# Patient Record
Sex: MALE | Race: WHITE | NOT HISPANIC OR LATINO | Employment: FULL TIME | URBAN - METROPOLITAN AREA
[De-identification: names, ages, dates, MRNs, and addresses within clinical notes are randomized per-mention and may not be internally consistent; named-entity substitution may affect disease eponyms.]

---

## 2018-06-21 ENCOUNTER — OFFICE VISIT (OUTPATIENT)
Dept: FAMILY MEDICINE | Facility: CLINIC | Age: 63
End: 2018-06-21
Payer: MEDICARE

## 2018-06-21 VITALS
TEMPERATURE: 99.8 F | HEART RATE: 76 BPM | BODY MASS INDEX: 26.51 KG/M2 | SYSTOLIC BLOOD PRESSURE: 120 MMHG | HEIGHT: 69 IN | DIASTOLIC BLOOD PRESSURE: 68 MMHG | RESPIRATION RATE: 14 BRPM | WEIGHT: 179 LBS | OXYGEN SATURATION: 95 %

## 2018-06-21 DIAGNOSIS — L03.115 CELLULITIS OF RIGHT LEG: Primary | ICD-10-CM

## 2018-06-21 PROCEDURE — 99203 OFFICE O/P NEW LOW 30 MIN: CPT | Performed by: FAMILY MEDICINE

## 2018-06-21 RX ORDER — CEPHALEXIN 500 MG/1
500 CAPSULE ORAL 3 TIMES DAILY
Qty: 21 CAPSULE | Refills: 0 | Status: SHIPPED | OUTPATIENT
Start: 2018-06-21 | End: 2018-06-28

## 2018-06-21 NOTE — PATIENT INSTRUCTIONS
Cellulitis  Cellulitis is an infection of the deep layers of skin. A break in the skin, such as a cut or scratch, can let bacteria under the skin. If the bacteria get to deep layers of the skin, it can be serious. If not treated, cellulitis can get into the bloodstream and lymph nodes. The infection can then spread throughout the body. This causes serious illness.  Cellulitis causes the affected skin to become red, swollen, warm, and sore. The reddened areas have a visible border. An open sore may leak fluid (pus). You may have a fever, chills, and pain.  Cellulitis is treated with antibiotics taken for 7 to 10 days. An open sore may be cleaned and covered with cool wet gauze. Symptoms should get better 1 to 2 days after treatment is started. Make sure to take all the antibiotics for the full number of days until they are gone. Keep taking the medicine even if your symptoms go away.  Home care  Follow these tips:    Limit the use of the part of your body with cellulitis.     If the infection is on your leg, keep your leg raised while sitting. This will help to reduce swelling.    Take all of the antibiotic medicine exactly as directed until it is gone. Do not miss any doses, especially during the first 7 days. Don t stop taking the medicine when your symptoms get better.    Keep the affected area clean and dry.    Wash your hands with soap and warm water before and after touching your skin. Anyone else who touches your skin should also wash his or her hands. Don't share towels.  Follow-up care  Follow up with your healthcare provider, or as advised. If your infection does not go away on the first antibiotic, your healthcare provider will prescribe a different one.  When to seek medical advice  Call your healthcare provider right away if any of these occur:    Red areas that spread    Swelling or pain that gets worse    Fluid leaking from the skin (pus)    Fever higher of 100.4  F (38.0  C) or higher after 2 days  on antibiotics  Date Last Reviewed: 9/1/2016 2000-2017 The [employer]. [address] [place] [address], [place] [address]. All rights reserved. This information is not intended as a substitute for professional medical care. Always follow your healthcare professional's instructions.

## 2018-06-21 NOTE — PROGRESS NOTES
SUBJECTIVE:   Niko Harley is a 63 year old male who presents to clinic today for the following health issues:        Rash      Duration: Sudden onset of chills and flu-like symptoms on Monday (which have now resolved). Rash/redness in right lower leg started about 2 days ago     Description  Location: Right leg   Itching: no    Intensity:  severe    Accompanying signs and symptoms: Painful, red, swollen, tender to touch, had high temperature on Tuesday with body aches and chills    History (similar episodes/previous evaluation): None    Precipitating or alleviating factors:  New exposures:  None  Recent travel: no      Therapies tried and outcome: None        Walk-in pt who is new to our clinic, presenting today with 2 days of redness in right lower extremity.  Had previous flu-like symptoms which started last Monday with chills and malaise but now have resolved.  Had recent U/S study of his LE's as a routine follow up for his hx of LE edema and it did not show any abnormalities.    Problem list and histories reviewed & adjusted, as indicated.  Additional history: as documented    Labs reviewed in EPIC    Reviewed and updated as needed this visit by clinical staff  Tobacco  Allergies  Meds  Problems  Med Hx  Surg Hx  Fam Hx  Soc Hx        Reviewed and updated as needed this visit by Provider  Allergies  Meds  Problems         ROS:  CONSTITUTIONAL: NEGATIVE for fever, chills, change in weight  EYES: NEGATIVE for vision changes or irritation  ENT/MOUTH: NEGATIVE for ear, mouth and throat problems  RESP: NEGATIVE for significant cough or SOB  CV: NEGATIVE for chest pain, palpitations or peripheral edema  GI: NEGATIVE for nausea, abdominal pain, heartburn, or change in bowel habits  : NEGATIVE for frequency, dysuria, or hematuria  MUSCULOSKELETAL: NEGATIVE for significant arthralgias or myalgia  NEURO: NEGATIVE for weakness, dizziness or paresthesias  HEME: NEGATIVE for bleeding problems  PSYCHIATRIC:  "NEGATIVE for changes in mood or affect    OBJECTIVE:     /68 (BP Location: Left arm, Patient Position: Sitting, Cuff Size: Adult Regular)  Pulse 76  Temp 99.8  F (37.7  C) (Tympanic)  Resp 14  Ht 5' 9\" (1.753 m)  Wt 179 lb (81.2 kg)  SpO2 95%  BMI 26.43 kg/m2  Body mass index is 26.43 kg/(m^2).   GENERAL: alert and no acute distress  EYES: Eyes grossly normal to inspection, PERRL and conjunctivae and sclerae normal  HENT: ear canals and TM's normal, nose and mouth without ulcers or lesions  NECK: no adenopathy, no asymmetry, masses, or scars and thyroid normal to palpation  RESP: lungs clear to auscultation - no rales, rhonchi or wheezes  CV: regular rate and rhythm, normal S1 S2, no S3 or S4, no murmur, click or rub, and peripheral pulses strong  ABDOMEN: soft, nontender, no hepatosplenomegaly, no masses and bowel sounds normal  MS: no gross musculoskeletal defects noted  SKIN: erythematous well demarcated rash located from just below the right knee to the top of the right ankle joint.  No discharge or sores present.  Redness is non-blanchable.  +tenderness reproducible with moderate palpation.  +2 pitting edema in RLE as well.   NEURO: Normal strength and tone, mentation intact and speech normal  PSYCH: mentation appears normal, affect normal/bright    Diagnostic Test Results:  none     ASSESSMENT/PLAN:     Problem List Items Addressed This Visit     None      Visit Diagnoses     Cellulitis of right leg    -  Primary    Relevant Medications    cephALEXin (KEFLEX) 500 MG capsule         Rx Keflex.  Will RTC in 1-2 weeks or sooner if needed to f/u on right leg cellulitis.  Will keep leg dry and clean; raise leg frequently to help with associated LE edema.  May take Tylenol PRN pain if needed.  Will also make an appt soon to get preventive cares completed/discussed at an annual physical exam.        Ingrid Donovan, DO  [facility]  "

## 2018-06-21 NOTE — MR AVS SNAPSHOT
After Visit Summary   6/21/2018    Niko Harley    MRN: [id number]           Patient Information     Date Of Birth          1955        Visit Information        Provider Department      6/21/2018 1:10 PM Ingrid Donovan DO [facility]        Today's Diagnoses     Cellulitis of right leg    -  1      Care Instructions      Cellulitis  Cellulitis is an infection of the deep layers of skin. A break in the skin, such as a cut or scratch, can let bacteria under the skin. If the bacteria get to deep layers of the skin, it can be serious. If not treated, cellulitis can get into the bloodstream and lymph nodes. The infection can then spread throughout the body. This causes serious illness.  Cellulitis causes the affected skin to become red, swollen, warm, and sore. The reddened areas have a visible border. An open sore may leak fluid (pus). You may have a fever, chills, and pain.  Cellulitis is treated with antibiotics taken for 7 to 10 days. An open sore may be cleaned and covered with cool wet gauze. Symptoms should get better 1 to 2 days after treatment is started. Make sure to take all the antibiotics for the full number of days until they are gone. Keep taking the medicine even if your symptoms go away.  Home care  Follow these tips:    Limit the use of the part of your body with cellulitis.     If the infection is on your leg, keep your leg raised while sitting. This will help to reduce swelling.    Take all of the antibiotic medicine exactly as directed until it is gone. Do not miss any doses, especially during the first 7 days. Don t stop taking the medicine when your symptoms get better.    Keep the affected area clean and dry.    Wash your hands with soap and warm water before and after touching your skin. Anyone else who touches your skin should also wash his or her hands. Don't share towels.  Follow-up care  Follow up with your healthcare provider, or as  "advised. If your infection does not go away on the first antibiotic, your healthcare provider will prescribe a different one.  When to seek medical advice  Call your healthcare provider right away if any of these occur:    Red areas that spread    Swelling or pain that gets worse    Fluid leaking from the skin (pus)    Fever higher of 100.4  F (38.0  C) or higher after 2 days on antibiotics  Date Last Reviewed: 9/1/2016 2000-2017 [employer]. [address] [place] [address]. All rights reserved. This information is not intended as a substitute for professional medical care. Always follow your healthcare professional's instructions.                Follow-ups after your visit        Who to contact     If you have questions or need follow up information about today's clinic visit or your schedule please contact [facility] directly at [phone].  Normal or non-critical lab and imaging results will be communicated to you by MyChart, letter or phone within 4 business days after the clinic has received the results. If you do not hear from us within 7 days, please contact the clinic through MyChart or phone. If you have a critical or abnormal lab result, we will notify you by phone as soon as possible.  Submit refill requests through [employer] or call your pharmacy and they will forward the refill request to us. Please allow 3 business days for your refill to be completed.          Additional Information About Your Visit        Care EveryWhere ID     This is your Care EveryWhere ID. This could be used by other organizations to access your [place] medical records  [id number]        Your Vitals Were     Pulse Temperature Respirations Height Pulse Oximetry BMI (Body Mass Index)    76 99.8  F (37.7  C) (Tympanic) 14 5' 9\" (1.753 m) 95% 26.43 kg/m2       Blood Pressure from Last 3 Encounters:   06/21/18 120/68    Weight from Last 3 Encounters:   06/21/18 179 lb (81.2 " kg)              Today, you had the following     No orders found for display         Today's Medication Changes          These changes are accurate as of 6/21/18  1:42 PM.  If you have any questions, ask your nurse or doctor.               Start taking these medicines.        Dose/Directions    cephALEXin 500 MG capsule   Commonly known as:  KEFLEX   Used for:  Cellulitis of right leg   Started by:  Ingrid Donovan DO        Dose:  500 mg   Take 1 capsule (500 mg) by mouth 3 times daily for 7 days   Quantity:  21 capsule   Refills:  0            Where to get your medicines      These medications were sent to [facility]/pharmacy #[id number] - [place], [place] - [address]  [address] [facility] [address]     Phone:  [phone]     cephALEXin 500 MG capsule                Primary Care Provider Office Phone # Fax #    Ingrid Donovan [phone]       [address]  [facility] [address]        Equal Access to Services     MARGE FLORENCE : Hadii glenna ku hadasho Soomaali, waaxda luqadaha, qaybta kaalmada adeegyada, waxay petronain haymichellen simin bhatt . So [facility] [phone].    ATENCIÓN: Si habla español, tiene a guillermo disposición servicios gratuitos de asistencia lingüística. Llame al [phone].    We comply with applicable federal civil rights laws and [place] laws. We do not discriminate on the basis of race, color, national origin, age, disability, sex, sexual orientation, or gender identity.            Thank you!     Thank you for choosing [facility]  for your care. Our goal is always to provide you with excellent care. Hearing back from our patients is one way we can continue to improve our services. Please take a few minutes to complete the written survey that you may receive in the mail after your visit with us. Thank you!             Your Updated Medication List - Protect others around you: Learn how to safely use, store and throw away your  medicines at www.disposemymeds.org.          This list is accurate as of 6/21/18  1:42 PM.  Always use your most recent med list.                   Brand Name Dispense Instructions for use Diagnosis    cephALEXin 500 MG capsule    KEFLEX    21 capsule    Take 1 capsule (500 mg) by mouth 3 times daily for 7 days    Cellulitis of right leg

## 2018-06-29 ENCOUNTER — OFFICE VISIT (OUTPATIENT)
Dept: FAMILY MEDICINE | Facility: CLINIC | Age: 63
End: 2018-06-29
Payer: MEDICARE

## 2018-06-29 VITALS
WEIGHT: 172 LBS | BODY MASS INDEX: 25.4 KG/M2 | HEART RATE: 82 BPM | OXYGEN SATURATION: 94 % | SYSTOLIC BLOOD PRESSURE: 116 MMHG | DIASTOLIC BLOOD PRESSURE: 78 MMHG | RESPIRATION RATE: 16 BRPM | TEMPERATURE: 97.5 F

## 2018-06-29 DIAGNOSIS — E78.5 HYPERLIPIDEMIA, UNSPECIFIED HYPERLIPIDEMIA TYPE: ICD-10-CM

## 2018-06-29 DIAGNOSIS — L03.115 CELLULITIS OF RIGHT LOWER EXTREMITY: Primary | ICD-10-CM

## 2018-06-29 DIAGNOSIS — R73.03 PRE-DIABETES: ICD-10-CM

## 2018-06-29 DIAGNOSIS — Z72.0 TOBACCO ABUSE: ICD-10-CM

## 2018-06-29 DIAGNOSIS — Z71.6 TOBACCO ABUSE COUNSELING: ICD-10-CM

## 2018-06-29 LAB
ANION GAP SERPL CALCULATED.3IONS-SCNC: 8 MMOL/L (ref 3–14)
BASOPHILS # BLD AUTO: 0 10E9/L (ref 0–0.2)
BASOPHILS NFR BLD AUTO: 0.4 %
BUN SERPL-MCNC: 16 MG/DL (ref 7–30)
CALCIUM SERPL-MCNC: 9.2 MG/DL (ref 8.5–10.1)
CHLORIDE SERPL-SCNC: 107 MMOL/L (ref 94–109)
CO2 SERPL-SCNC: 24 MMOL/L (ref 20–32)
CREAT SERPL-MCNC: 0.86 MG/DL (ref 0.66–1.25)
DIFFERENTIAL METHOD BLD: NORMAL
EOSINOPHIL # BLD AUTO: 0.3 10E9/L (ref 0–0.7)
EOSINOPHIL NFR BLD AUTO: 2.6 %
ERYTHROCYTE [DISTWIDTH] IN BLOOD BY AUTOMATED COUNT: 14.5 % (ref 10–15)
GFR SERPL CREATININE-BSD FRML MDRD: 89 ML/MIN/1.7M2
GLUCOSE SERPL-MCNC: 92 MG/DL (ref 70–99)
HBA1C MFR BLD: 5.8 % (ref 0–5.6)
HCT VFR BLD AUTO: 45.4 % (ref 40–53)
HGB BLD-MCNC: 15.4 G/DL (ref 13.3–17.7)
LYMPHOCYTES # BLD AUTO: 2.8 10E9/L (ref 0.8–5.3)
LYMPHOCYTES NFR BLD AUTO: 29.1 %
MCH RBC QN AUTO: 31.6 PG (ref 26.5–33)
MCHC RBC AUTO-ENTMCNC: 33.9 G/DL (ref 31.5–36.5)
MCV RBC AUTO: 93 FL (ref 78–100)
MONOCYTES # BLD AUTO: 0.8 10E9/L (ref 0–1.3)
MONOCYTES NFR BLD AUTO: 7.7 %
NEUTROPHILS # BLD AUTO: 5.8 10E9/L (ref 1.6–8.3)
NEUTROPHILS NFR BLD AUTO: 60.2 %
PLATELET # BLD AUTO: 284 10E9/L (ref 150–450)
POTASSIUM SERPL-SCNC: 4.2 MMOL/L (ref 3.4–5.3)
RBC # BLD AUTO: 4.88 10E12/L (ref 4.4–5.9)
SODIUM SERPL-SCNC: 139 MMOL/L (ref 133–144)
WBC # BLD AUTO: 9.7 10E9/L (ref 4–11)

## 2018-06-29 PROCEDURE — 83036 HEMOGLOBIN GLYCOSYLATED A1C: CPT | Performed by: FAMILY MEDICINE

## 2018-06-29 PROCEDURE — 85025 COMPLETE CBC W/AUTO DIFF WBC: CPT | Performed by: FAMILY MEDICINE

## 2018-06-29 PROCEDURE — 80048 BASIC METABOLIC PNL TOTAL CA: CPT | Performed by: FAMILY MEDICINE

## 2018-06-29 PROCEDURE — 36415 COLL VENOUS BLD VENIPUNCTURE: CPT | Performed by: FAMILY MEDICINE

## 2018-06-29 PROCEDURE — 99214 OFFICE O/P EST MOD 30 MIN: CPT | Performed by: FAMILY MEDICINE

## 2018-06-29 RX ORDER — SULFAMETHOXAZOLE/TRIMETHOPRIM 800-160 MG
1 TABLET ORAL 2 TIMES DAILY
Qty: 14 TABLET | Refills: 0 | Status: SHIPPED | OUTPATIENT
Start: 2018-06-29

## 2018-06-29 ASSESSMENT — PATIENT HEALTH QUESTIONNAIRE - PHQ9
10. IF YOU CHECKED OFF ANY PROBLEMS, HOW DIFFICULT HAVE THESE PROBLEMS MADE IT FOR YOU TO DO YOUR WORK, TAKE CARE OF THINGS AT HOME, OR GET ALONG WITH OTHER PEOPLE: NOT DIFFICULT AT ALL
SUM OF ALL RESPONSES TO PHQ QUESTIONS 1-9: 0
SUM OF ALL RESPONSES TO PHQ QUESTIONS 1-9: 0

## 2018-06-29 NOTE — LETTER
"July 5, 2018      Niko Harley  [address] [facility] [address]        Dear ,    We are writing to inform you of your test results.    1.  \"PRE-DIABETES\" :  It looks like you have \"pre-Diabetes\" with a HgbA1c of 5.8%.    Pre-Diabetes is with a  HgbA2c of 5.7--6.4%  Diabetes is with a HgbA1c of 6.5% and above.    We should plan on re-checking your HgbA1c in about 3-6 months to make sure that it gets better and does not worsen.  Things to help lower your HgbA1c and prevent the development of Diabetes includes working on lifestyle changes (diet, exercise, weight-loss, quitting smoking, etc).    When you have \"pre-Diabetes\" you are at a greater risk of developing Diabetes if you do not work on those lifestyle changes.    2.  OTHER LABS :  Your blood count and kidney function and electrolytes are all normal, which is great news!       Resulted Orders   Hemoglobin A1c   Result Value Ref Range    Hemoglobin A1C 5.8 (H) 0 - 5.6 %      Comment:      Normal <5.7% Prediabetes 5.7-6.4%  Diabetes 6.5% or higher - adopted from ADA   consensus guidelines.     Basic metabolic panel  (Ca, Cl, CO2, Creat, Gluc, K, Na, BUN)   Result Value Ref Range    Sodium 139 133 - 144 mmol/L    Potassium 4.2 3.4 - 5.3 mmol/L    Chloride 107 94 - 109 mmol/L    Carbon Dioxide 24 20 - 32 mmol/L    Anion Gap 8 3 - 14 mmol/L    Glucose 92 70 - 99 mg/dL    Urea Nitrogen 16 7 - 30 mg/dL    Creatinine 0.86 0.66 - 1.25 mg/dL    GFR Estimate 89 >60 mL/min/1.7m2      Comment:      Non  GFR Calc    GFR Estimate If Black >90 >60 mL/min/1.7m2      Comment:       GFR Calc    Calcium 9.2 8.5 - 10.1 mg/dL   CBC with platelets and differential   Result Value Ref Range    WBC 9.7 4.0 - 11.0 10e9/L    RBC Count 4.88 4.4 - 5.9 10e12/L    Hemoglobin 15.4 13.3 - 17.7 g/dL    Hematocrit 45.4 40.0 - 53.0 %    MCV 93 78 - 100 fl    MCH 31.6 26.5 - 33.0 pg    MCHC 33.9 31.5 - 36.5 g/dL    RDW 14.5 10.0 - 15.0 %    Platelet " Count 284 150 - 450 10e9/L    Diff Method Automated Method     % Neutrophils 60.2 %    % Lymphocytes 29.1 %    % Monocytes 7.7 %    % Eosinophils 2.6 %    % Basophils 0.4 %    Absolute Neutrophil 5.8 1.6 - 8.3 10e9/L    Absolute Lymphocytes 2.8 0.8 - 5.3 10e9/L    Absolute Monocytes 0.8 0.0 - 1.3 10e9/L    Absolute Eosinophils 0.3 0.0 - 0.7 10e9/L    Absolute Basophils 0.0 0.0 - 0.2 10e9/L       If you have any questions or concerns, please call the clinic at the number listed above.       Sincerely,        Ingrid Donovan, DO

## 2018-06-29 NOTE — MR AVS SNAPSHOT
After Visit Summary   6/29/2018    Niko Harley    MRN: [id number]           Patient Information     Date Of Birth          1955        Visit Information        Provider Department      6/29/2018 9:50 AM Ingrid Donovan DO [facility]        Today's Diagnoses     Cellulitis of right lower extremity    -  1    Pre-diabetes           Follow-ups after your visit        Follow-up notes from your care team     Return in about 1 week (around 7/6/2018).      Who to contact     If you have questions or need follow up information about today's clinic visit or your schedule please contact [facility] directly at [phone].  Normal or non-critical lab and imaging results will be communicated to you by MyChart, letter or phone within 4 business days after the clinic has received the results. If you do not hear from us within 7 days, please contact the clinic through MyChart or phone. If you have a critical or abnormal lab result, we will notify you by phone as soon as possible.  Submit refill requests through [employer] or call your pharmacy and they will forward the refill request to us. Please allow 3 business days for your refill to be completed.          Additional Information About Your Visit        Care EveryWhere ID     This is your Care EveryWhere ID. This could be used by other organizations to access your [place] medical records  [id number]        Your Vitals Were     Pulse Temperature Respirations Pulse Oximetry BMI (Body Mass Index)       82 97.5  F (36.4  C) (Tympanic) 16 94% 25.4 kg/m2        Blood Pressure from Last 3 Encounters:   06/29/18 116/78   06/21/18 120/68    Weight from Last 3 Encounters:   06/29/18 172 lb (78 kg)   06/21/18 179 lb (81.2 kg)              We Performed the Following     Basic metabolic panel  (Ca, Cl, CO2, Creat, Gluc, K, Na, BUN)     CBC with platelets and differential     Hemoglobin A1c           Today's Medication Changes          These changes are accurate as of 6/29/18 10:20 AM.  If you have any questions, ask your nurse or doctor.               Start taking these medicines.        Dose/Directions    sulfamethoxazole-trimethoprim 800-160 MG per tablet   Commonly known as:  BACTRIM DS/SEPTRA DS   Used for:  Cellulitis of right lower extremity   Started by:  Ingrid Donovan DO        Dose:  1 tablet   Take 1 tablet by mouth 2 times daily   Quantity:  14 tablet   Refills:  0            Where to get your medicines      These medications were sent to [facility]/pharmacy #[id number] - [place], [place] - [address]  [address] [facility] [address]     Phone:  [phone]     sulfamethoxazole-trimethoprim 800-160 MG per tablet                Primary Care Provider Office Phone # Fax #    Ingrid Donovan [phone] [address]  [facility] [address]        Equal Access to Services     MARGE FLORENCE : Hadii glenna ku hadasho Soomaali, waaxda luqadaha, qaybta kaalmada adeegyada, waxay petronain hayraghavendra bhatt . So [facility] [phone].    ATENCIÓN: Si habla español, tiene a guillermo disposición servicios gratuitos de asistencia lingüística. Llame al [phone].    We comply with applicable federal civil rights laws and [place] laws. We do not discriminate on the basis of race, color, national origin, age, disability, sex, sexual orientation, or gender identity.            Thank you!     [facility]  [facility] your [facility]. Our goal is always to provide you with excellent care. Hearing back from our patients is one way we can continue to improve our services. Please take a few minutes to complete the written survey that you may receive in the mail after your visit with us. Thank you!             Your Updated Medication List - Protect others around you: Learn how to safely use, store and throw away your medicines at  www.disposemymeds.org.          This list is accurate as of 6/29/18 10:20 AM.  Always use your most recent med list.                   Brand Name Dispense Instructions for use Diagnosis    sulfamethoxazole-trimethoprim 800-160 MG per tablet    BACTRIM DS/SEPTRA DS    14 tablet    Take 1 tablet by mouth 2 times daily    Cellulitis of right lower extremity

## 2018-06-29 NOTE — PROGRESS NOTES
SUBJECTIVE:   Niko Harley is a 63 year old male who presents to clinic today for the following health issues:      Cellulitis follow Up      Duration: started 6/18/18    Description  Location: RT lower leg    Intensity:  moderate    Accompanying signs and symptoms: swelling, redness, lethagic and tender    History  Previous similar problem: no   Previous evaluation:  none    Precipitating or alleviating factors:  Trauma or overuse: no   Aggravating factors include: none    Therapies tried and outcome: Keflex- finished course last night    Area of redness on RLE has improved but still present and somewhat painful to touch.  Continues to smoke tobacco.  Had HgbA1c tested >1 year ago at outside clinic which showed pre-DM (5.8%).    Problem list and histories reviewed & adjusted, as indicated.  Additional history: as documented    Labs reviewed in EPIC    Reviewed and updated as needed this visit by clinical staff  Tobacco  Allergies  Meds  Problems  Med Hx  Surg Hx  Fam Hx  Soc Hx        Reviewed and updated as needed this visit by Provider  Allergies  Meds  Problems         PHQ-2 Score: 3  If you checked off any problems, how difficult have these problems made it for you to do your work, take care of things at home, or get along with other people?: Not difficult at all  PHQ9 TOTAL SCORE: 0      ROS:  CONSTITUTIONAL: NEGATIVE for fever, chills, change in weight  EYES: NEGATIVE for vision changes or irritation  ENT/MOUTH: NEGATIVE for ear, mouth and throat problems  RESP: NEGATIVE for significant cough or SOB  CV: NEGATIVE for chest pain, palpitations or peripheral edema  GI: NEGATIVE for nausea, abdominal pain, heartburn, or change in bowel habits  : NEGATIVE for frequency, dysuria, or hematuria  MUSCULOSKELETAL: NEGATIVE for significant arthralgias or myalgia  NEURO: NEGATIVE for weakness, dizziness or paresthesias  HEME: NEGATIVE for bleeding problems    OBJECTIVE:     /78 (Cuff Size: Adult  Regular)  Pulse 82  Temp 97.5  F (36.4  C) (Tympanic)  Resp 16  Wt 172 lb (78 kg)  SpO2 94%  BMI 25.4 kg/m2  Body mass index is 25.4 kg/(m^2).   GENERAL: alert and no acute distress  EYES: Eyes grossly normal to inspection, PERRL and conjunctivae and sclerae normal  HENT: Nose and mouth without ulcers or lesions  NECK: no adenopathy, no asymmetry, masses, or scars and thyroid normal to palpation  RESP: lungs clear to auscultation - no rales, rhonchi or wheezes  CV: regular rate and rhythm, normal S1 S2, no S3 or S4, no murmur, click or rub, and peripheral pulses strong  ABDOMEN: soft, nontender, no hepatosplenomegaly, no masses and bowel sounds normal  MS: no gross musculoskeletal defects noted  SKIN: erythematous well demarcated rash located from just below the right knee to the top of the right ankle joint.  No discharge or sores present.  Redness is blanchable.  +tenderness reproducible with moderate palpation.  +1 non-pitting edema in RLE as well.   NEURO: Normal strength and tone, mentation intact and speech normal  PSYCH: +irritableAnswers for HPI/ROS submitted by the patient on 6/29/2018        Diagnostic Test Results:  none     ASSESSMENT/PLAN:     Problem List Items Addressed This Visit     Pre-diabetes    Relevant Orders    Hemoglobin A1c (Completed)    Basic metabolic panel  (Ca, Cl, CO2, Creat, Gluc, K, Na, BUN) (Completed)    Tobacco abuse    HLD (hyperlipidemia)      Other Visit Diagnoses     Cellulitis of right lower extremity    -  Primary    Relevant Medications    sulfamethoxazole-trimethoprim (BACTRIM DS/SEPTRA DS) 800-160 MG per tablet    Other Relevant Orders    CBC with platelets and differential (Completed)    Tobacco abuse counseling             --Rx Bactrim for RLE cellulitis.  Finished Keflex yesterday with some persisting symptoms/signs of RLE Cellulitis, so will add Bactrim today.  Denies hx MRSA infection  --In Care Everywhere, appears to have Pre-DM with HgbA1c 5.8% (4/2017) so  "will re-check today, along with CBC and BMP.  Lipids check in April 2017 as well.  Refused Lipitor prescribed by previous provider for HLD because he didn't want to \"take that crap.\"    --Tobacco cessation counseling provided; has no desire to quit smoking.    Ingrid Donovan, DO  [facility]    "

## 2018-06-30 ASSESSMENT — PATIENT HEALTH QUESTIONNAIRE - PHQ9: SUM OF ALL RESPONSES TO PHQ QUESTIONS 1-9: 0

## 2019-06-24 ENCOUNTER — TELEPHONE (OUTPATIENT)
Dept: FAMILY MEDICINE | Facility: CLINIC | Age: 64
End: 2019-06-24

## 2019-06-24 NOTE — TELEPHONE ENCOUNTER
Panel Management Review      Patient has the following on his problem list: None      Composite cancer screening  Chart review shows that this patient is due/due soon for the following Colonoscopy  Summary:    Patient is due/failing the following:   COLONOSCOPY and PHYSICAL    Action needed:   Patient needs office visit for Physical  .    Type of outreach:    Sent letter.    Questions for provider review:    None                                                                                                                                    Sindy Leonardo LPN     Chart routed to  .

## 2019-06-24 NOTE — LETTER
June 24, 2019    Niko Harley  [address]  [facility] [address]    Dear Ricardo Pope cares about your health and your health plan.  I have reviewed your medical conditions, medication list and lab results, and am making recommendations based on this review to better manage your health.    You are in particular need of attention regarding:  -Immunizations  -Colon Cancer Screening  -Wellness (Physical) Visit     I am recommending that you:     -schedule a WELLNESS (Physical) APPOINTMENT with me.   I will check fasting labs the same day - nothing to eat except water and meds for 8-10 hours prior. (If you go elsewhere for Wellness visits then please disregard this reminder.)    -schedule a COLONOSCOPY to look for colon cancer (due every 10 years or 5 years in higher risk situations.)        Colon cancer is now the second leading cause of cancer-related deaths in the [place] for both men and women and there are over 130,000 new cases and 50,000 deaths per year from colon cancer.  Colonoscopies can prevent 90-95% of these deaths.  Problem lesions can be removed before they ever become cancer.  This test is not only looking for cancer, but also getting rid of precancerious lesions.    If you are under/uninsured, we recommend you contact the [employer] program. [employer] is a free colorectal cancer screening program that provides colonoscopies for eligible under/uninsured [place] men and women. If you are interested in receiving a free colonoscopy, please call [employer] at [phone] (mention code [employer]) to see if you re eligible.      If you do not wish to do a colonoscopy or cannot afford to do one, at this time, there is another option. It is called a FIT test or Fecal Immunochemical Occult Blood Test (take home stool sample kit).  It does not replace the colonoscopy for colorectal cancer screening, but it can detect hidden bleeding in the lower colon.  It does need to be repeated  every year and if a positive result is obtained, you would be referred for a colonoscopy.          If you have completed either one of these tests at another facility, please call with the details of when and where the tests were done and if they were normal or not. Or have the records sent to our clinic so that we can best coordinate your care.      Please call us at the [facility] location:  [phone] or use [employer] to address the above recommendations.     Thank you for trusting [facility].  We appreciate the opportunity to serve you and look forward to supporting your healthcare in the future.    If you have (or plan to have) any of these tests done at a facility other than a [facility] or a [facility], please have the results sent to the [facility] location noted above.      Best Regards,    Ingrid Donovan, DO

## 2024-06-07 ENCOUNTER — HOSPITAL ENCOUNTER (EMERGENCY)
Facility: CLINIC | Age: 69
Discharge: HOME | End: 2024-06-07
Attending: EMERGENCY MEDICINE | Admitting: EMERGENCY MEDICINE
Payer: COMMERCIAL

## 2024-06-07 ENCOUNTER — APPOINTMENT (OUTPATIENT)
Dept: GENERAL RADIOLOGY | Facility: CLINIC | Age: 69
End: 2024-06-07
Attending: EMERGENCY MEDICINE
Payer: COMMERCIAL

## 2024-06-07 VITALS
DIASTOLIC BLOOD PRESSURE: 65 MMHG | RESPIRATION RATE: 14 BRPM | HEIGHT: 69 IN | WEIGHT: 170 LBS | TEMPERATURE: 98 F | HEART RATE: 70 BPM | BODY MASS INDEX: 25.18 KG/M2 | OXYGEN SATURATION: 96 % | SYSTOLIC BLOOD PRESSURE: 128 MMHG

## 2024-06-07 DIAGNOSIS — M62.830 SPASM OF THORACIC BACK MUSCLE: ICD-10-CM

## 2024-06-07 DIAGNOSIS — I45.10 RIGHT BUNDLE BRANCH BLOCK: ICD-10-CM

## 2024-06-07 LAB
ALBUMIN SERPL BCG-MCNC: 4.3 G/DL (ref 3.5–5.2)
ALP SERPL-CCNC: 85 U/L (ref 40–150)
ALT SERPL W P-5'-P-CCNC: 14 U/L (ref 0–70)
ANION GAP SERPL CALCULATED.3IONS-SCNC: 11 MMOL/L (ref 7–15)
AST SERPL W P-5'-P-CCNC: 19 U/L (ref 0–45)
ATRIAL RATE - MUSE: 72 BPM
BASOPHILS # BLD AUTO: 0.1 10E3/UL (ref 0–0.2)
BASOPHILS NFR BLD AUTO: 1 %
BILIRUB SERPL-MCNC: 0.2 MG/DL
BUN SERPL-MCNC: 20 MG/DL (ref 8–23)
CALCIUM SERPL-MCNC: 9.6 MG/DL (ref 8.8–10.2)
CHLORIDE SERPL-SCNC: 104 MMOL/L (ref 98–107)
CREAT SERPL-MCNC: 1.02 MG/DL (ref 0.67–1.17)
DEPRECATED HCO3 PLAS-SCNC: 24 MMOL/L (ref 22–29)
DIASTOLIC BLOOD PRESSURE - MUSE: NORMAL MMHG
EGFRCR SERPLBLD CKD-EPI 2021: 79 ML/MIN/1.73M2
EOSINOPHIL # BLD AUTO: 0.2 10E3/UL (ref 0–0.7)
EOSINOPHIL NFR BLD AUTO: 2 %
ERYTHROCYTE [DISTWIDTH] IN BLOOD BY AUTOMATED COUNT: 14 % (ref 10–15)
GLUCOSE SERPL-MCNC: 120 MG/DL (ref 70–99)
HCT VFR BLD AUTO: 43.8 % (ref 40–53)
HGB BLD-MCNC: 14.7 G/DL (ref 13.3–17.7)
HOLD SPECIMEN: NORMAL
IMM GRANULOCYTES # BLD: 0 10E3/UL
IMM GRANULOCYTES NFR BLD: 0 %
INTERPRETATION ECG - MUSE: NORMAL
LYMPHOCYTES # BLD AUTO: 2.2 10E3/UL (ref 0.8–5.3)
LYMPHOCYTES NFR BLD AUTO: 25 %
MCH RBC QN AUTO: 30.8 PG (ref 26.5–33)
MCHC RBC AUTO-ENTMCNC: 33.6 G/DL (ref 31.5–36.5)
MCV RBC AUTO: 92 FL (ref 78–100)
MONOCYTES # BLD AUTO: 0.8 10E3/UL (ref 0–1.3)
MONOCYTES NFR BLD AUTO: 8 %
NEUTROPHILS # BLD AUTO: 5.9 10E3/UL (ref 1.6–8.3)
NEUTROPHILS NFR BLD AUTO: 64 %
NRBC # BLD AUTO: 0 10E3/UL
NRBC BLD AUTO-RTO: 0 /100
P AXIS - MUSE: 73 DEGREES
PLATELET # BLD AUTO: 211 10E3/UL (ref 150–450)
POTASSIUM SERPL-SCNC: 4.1 MMOL/L (ref 3.4–5.3)
PR INTERVAL - MUSE: 182 MS
PROT SERPL-MCNC: 6.9 G/DL (ref 6.4–8.3)
QRS DURATION - MUSE: 134 MS
QT - MUSE: 412 MS
QTC - MUSE: 451 MS
R AXIS - MUSE: 93 DEGREES
RBC # BLD AUTO: 4.77 10E6/UL (ref 4.4–5.9)
SODIUM SERPL-SCNC: 139 MMOL/L (ref 135–145)
SYSTOLIC BLOOD PRESSURE - MUSE: NORMAL MMHG
T AXIS - MUSE: 66 DEGREES
TROPONIN T SERPL HS-MCNC: 8 NG/L
VENTRICULAR RATE- MUSE: 72 BPM
WBC # BLD AUTO: 9.1 10E3/UL (ref 4–11)

## 2024-06-07 PROCEDURE — 82040 ASSAY OF SERUM ALBUMIN: CPT | Performed by: EMERGENCY MEDICINE

## 2024-06-07 PROCEDURE — 93005 ELECTROCARDIOGRAM TRACING: CPT

## 2024-06-07 PROCEDURE — 99285 EMERGENCY DEPT VISIT HI MDM: CPT | Mod: 25

## 2024-06-07 PROCEDURE — 85004 AUTOMATED DIFF WBC COUNT: CPT | Performed by: EMERGENCY MEDICINE

## 2024-06-07 PROCEDURE — 80053 COMPREHEN METABOLIC PANEL: CPT | Performed by: EMERGENCY MEDICINE

## 2024-06-07 PROCEDURE — 84484 ASSAY OF TROPONIN QUANT: CPT | Performed by: EMERGENCY MEDICINE

## 2024-06-07 PROCEDURE — 36415 COLL VENOUS BLD VENIPUNCTURE: CPT | Performed by: EMERGENCY MEDICINE

## 2024-06-07 PROCEDURE — 71046 X-RAY EXAM CHEST 2 VIEWS: CPT

## 2024-06-07 PROCEDURE — 85025 COMPLETE CBC W/AUTO DIFF WBC: CPT | Performed by: EMERGENCY MEDICINE

## 2024-06-07 RX ORDER — METHOCARBAMOL 500 MG/1
1000 TABLET, FILM COATED ORAL 3 TIMES DAILY PRN
Qty: 30 TABLET | Refills: 0 | Status: SHIPPED | OUTPATIENT
Start: 2024-06-07

## 2024-06-07 RX ORDER — LIDOCAINE 4 G/G
1 PATCH TOPICAL DAILY PRN
Qty: 15 PATCH | Refills: 0 | Status: SHIPPED | OUTPATIENT
Start: 2024-06-07

## 2024-06-07 ASSESSMENT — ACTIVITIES OF DAILY LIVING (ADL)
ADLS_ACUITY_SCORE: 35

## 2024-06-07 ASSESSMENT — COLUMBIA-SUICIDE SEVERITY RATING SCALE - C-SSRS
6. HAVE YOU EVER DONE ANYTHING, STARTED TO DO ANYTHING, OR PREPARED TO DO ANYTHING TO END YOUR LIFE?: NO
2. HAVE YOU ACTUALLY HAD ANY THOUGHTS OF KILLING YOURSELF IN THE PAST MONTH?: NO
1. IN THE PAST MONTH, HAVE YOU WISHED YOU WERE DEAD OR WISHED YOU COULD GO TO SLEEP AND NOT WAKE UP?: NO

## 2024-06-07 NOTE — ED TRIAGE NOTES
Patient states has a nagging pain to left shoulder blade.  Taking ibuprofen.  Today, feeling a little faint, lightheaded; almost dizziness.  Denies chest pain.  Slightly nauseous.    Shoulder pain may have been caused by mowing lawn 8-9 days ago.  Seen at  today.  Sent for further eval of new RBBB.  No previous EKG.

## 2024-06-08 NOTE — ED NOTES
Patient up to the desk to see where he was in line.  Pt was informed of his placement and process.

## 2024-06-08 NOTE — ED PROVIDER NOTES
Emergency Department Note      History of Present Illness     Chief Complaint  Shoulder Pain    HPI  Niko Harley is a 69 year old male with history of hyperlipidemia who presents to the ED for evaluation of left upper back pain which has been present for 3 to 4 days.  Pain is worse with movement of the left arm especially when he holds the left arm up out in front of him.  No fall or trauma.  No anterior chest pain.  No neck pain.  He has taken 600 mg of ibuprofen without relief.  Patient was seen at urgent care prior to arrival here and referred to the emergency department after he was found to have right bundle branch block on EKG.  Patient states that this is a known finding for him and he has previously seen a cardiologist who has told him not to worry about it.  He denies any numbness tingling or weakness in the left arm.  No fever.  No rash.  He denies any other symptoms at this time.        Independent Historian  None    Review of External Notes  I reviewed the patient's echocardiogram report from 4/22/2024:  Procedure: 2D w/ Bubbles, Color Doppler and Spectral Doppler.   Indication for study: Cerebral Infarction   Cardiac Rhythm: Normal sinus.Study quality: Good.     Final Impressions:    1. Normal LV size, borderline wall thickness, EF of 55 - 60%.    2. Normal RV size and systolic function.    3. Normal biatrial size.    4. The aortic valve is sclerotic, mild stenosis (MG 15 mmHg, Vmax 2.6 m/s) - leaflets not well-visualized, cannot exclude bicuspid phenotype.    5. Dilated aorta: sinus 4.4 cm, ascending 4.3 cm.    6. Positive bubble study at rest and following Valsalva - source of shunt not well-visualized.     Past Medical History   Medical History and Problem List  Prediabetes  Tobacco abuse  Hyperlipidemia   Cerebral infarction    Medications  Bactrim DS/Septra  Niacin  Thiamine  Ascorbic acid  Vitamin A  Vitamin E  Aspirin 81 mg   Vistaril  Lipitor     Surgical History   Appendectomy  [place]  "teeth extraction   Physical Exam   Patient Vitals for the past 24 hrs:   BP Temp Temp src Pulse Resp SpO2 Height Weight   06/07/24 1645 128/65 98  F (36.7  C) Oral 70 14 96 % 1.753 m (5' 9\") 77.1 kg (170 lb)     Physical Exam  General: Well appearing, nontoxic. Resting comfortably  Head:  Scalp, face, and head appear normal  Eyes:  Pupils are equal, round    Conjunctivae non-injected and sclerae white  ENT:    The external nose is normal    Pinnae are normal  Neck:  Normal range of motion    There is no rigidity noted    Trachea is in the midline  CV:  Regular rate and rhythm     Normal S1/S2, no S3/S4    No murmur or rub. Radial pulses 2+ bilaterally.  Resp:  Lungs are clear and equal bilaterally  There is no tachypnea    No increased work of breathing    No rales, wheezing, or rhonchi  GI:  Abdomen is soft, no rigidity or guarding    No distension, or mass    No tenderness or rebound tenderness   MS:  Normal muscular tone. Left upper back rhomboid/lower trapezius tenderness and palpable spasm to palpation without swelling, erythema, ecchymosis. Left shoulder, arm, elbow, forearm and hand normal. CMS intact. T and L spine non-tender without stepoffs. Chest non tender to palpation.     Symmetric motor strength    No lower extremity edema  Skin:  No rash or acute skin lesions noted  Neuro:  Awake and alert  Speech is normal and fluent  Moves all extremities spontaneously  Psych: Normal affect. Appropriate interactions.      Diagnostics   Lab Results   Labs Ordered and Resulted from Time of ED Arrival to Time of ED Departure   COMPREHENSIVE METABOLIC PANEL - Abnormal       Result Value    Sodium 139      Potassium 4.1      Carbon Dioxide (CO2) 24      Anion Gap 11      Urea Nitrogen 20.0      Creatinine 1.02      GFR Estimate 79      Calcium 9.6      Chloride 104      Glucose 120 (*)     Alkaline Phosphatase 85      AST 19      ALT 14      Protein Total 6.9      Albumin 4.3      Bilirubin Total 0.2     TROPONIN T, " HIGH SENSITIVITY - Normal    Troponin T, High Sensitivity 8     CBC WITH PLATELETS AND DIFFERENTIAL    WBC Count 9.1      RBC Count 4.77      Hemoglobin 14.7      Hematocrit 43.8      MCV 92      MCH 30.8      MCHC 33.6      RDW 14.0      Platelet Count 211      % Neutrophils 64      % Lymphocytes 25      % Monocytes 8      % Eosinophils 2      % Basophils 1      % Immature Granulocytes 0      NRBCs per 100 WBC 0      Absolute Neutrophils 5.9      Absolute Lymphocytes 2.2      Absolute Monocytes 0.8      Absolute Eosinophils 0.2      Absolute Basophils 0.1      Absolute Immature Granulocytes 0.0      Absolute NRBCs 0.0       Imaging  Chest XR,  PA & LAT   Final Result   IMPRESSION:       Heart size is normal. Lungs are clear bilaterally. Tortuous thoracic aorta. No acute bony abnormalities.        Report per radiology    EKG   ECG results from 06/07/24   EKG 12-lead, tracing only     Value    Systolic Blood Pressure     Diastolic Blood Pressure     Ventricular Rate 72    Atrial Rate 72    TX Interval 182    QRS Duration 134        QTc 451    P Axis 73    R AXIS 93    T Axis 66    Interpretation ECG      Sinus rhythm  Right bundle branch block  Abnormal ECG  No previous ECGs available  Read at 2146       Independent Interpretation  See below    ED Course    Medications Administered  Medications - No data to display    Procedures  Procedures     Discussion of Management  None    Social Determinants of Health adding to complexity of care  None    ED Course  ED Course as of 06/07/24 2238 Fri Jun 07, 2024   2218 Patient seen and evaluated    2218 On my independent interpretation of the patient's chest radiograph(s) there is no pneumothorax or large pleural effusions.      Medical Decision Making / Diagnosis   CMS Diagnoses: None    MIPS      None    [facility]  Niko Harley is a 69 year old male who presents with left upper back pain.  ED evaluation is consistent with muscle spasm of the upper back.  Chest x-ray  unremarkable without acute findings.  Lungs clear.  Patient was referred to the emergency department after EKG at urgent care showed right bundle branch block.  Patient states that this is baseline for him and he has no cardiac symptoms at this time.  He has previously seen a cardiologist who told him it was nothing to worry about per his report.  No chest pain or pressure.  No ischemic symptoms.  No clinical symptoms to suggest pulmonary embolism or acute aortic pathology.  Remainder of the ED evaluation is otherwise reassuring.  EKG without ischemia or dysrhythmia.  I recommended supportive care with Tylenol, ibuprofen, Robaxin, lidocaine patches, heat and gentle massage to the area.  Follow-up with PCP if not improving.  Return precautions were provided.    Disposition  The patient was discharged.     ICD-10 Codes:    ICD-10-CM    1. Spasm of thoracic back muscle  M62.830       2. Right bundle branch block  I45.10            Discharge Medications  New Prescriptions    LIDOCAINE (LIDOCARE) 4 % PATCH    Place 1 patch onto the skin daily as needed for other (musculoskeletal pain) Remove patch after 12 hours. To prevent lidocaine toxicity, patient should be patch free for 12 hrs daily.    METHOCARBAMOL (ROBAXIN) 500 MG TABLET    Take 2 tablets (1,000 mg) by mouth 3 times daily as needed for muscle spasms     Scribe Disclosure:  I, Michelle Purvis, am serving as a scribe at 9:43 PM on 6/7/2024 to document services personally performed by Bob Flores MD based on my observations and the provider's statements to me.      Bob Flores MD  06/07/24 7126